# Patient Record
Sex: FEMALE | Race: WHITE | Employment: PART TIME | URBAN - NONMETROPOLITAN AREA
[De-identification: names, ages, dates, MRNs, and addresses within clinical notes are randomized per-mention and may not be internally consistent; named-entity substitution may affect disease eponyms.]

---

## 2023-09-30 SDOH — HEALTH STABILITY: PHYSICAL HEALTH: ON AVERAGE, HOW MANY MINUTES DO YOU ENGAGE IN EXERCISE AT THIS LEVEL?: 30 MIN

## 2023-09-30 SDOH — HEALTH STABILITY: PHYSICAL HEALTH: ON AVERAGE, HOW MANY DAYS PER WEEK DO YOU ENGAGE IN MODERATE TO STRENUOUS EXERCISE (LIKE A BRISK WALK)?: 3 DAYS

## 2023-10-03 ENCOUNTER — OFFICE VISIT (OUTPATIENT)
Dept: FAMILY MEDICINE CLINIC | Age: 18
End: 2023-10-03
Payer: COMMERCIAL

## 2023-10-03 VITALS
TEMPERATURE: 98.5 F | WEIGHT: 160.2 LBS | OXYGEN SATURATION: 99 % | BODY MASS INDEX: 23.73 KG/M2 | HEIGHT: 69 IN | SYSTOLIC BLOOD PRESSURE: 134 MMHG | RESPIRATION RATE: 14 BRPM | DIASTOLIC BLOOD PRESSURE: 86 MMHG | HEART RATE: 124 BPM

## 2023-10-03 DIAGNOSIS — D22.9 ATYPICAL NEVI: Primary | ICD-10-CM

## 2023-10-03 DIAGNOSIS — G43.709 CHRONIC MIGRAINE WITHOUT AURA WITHOUT STATUS MIGRAINOSUS, NOT INTRACTABLE: ICD-10-CM

## 2023-10-03 DIAGNOSIS — F41.1 GAD (GENERALIZED ANXIETY DISORDER): ICD-10-CM

## 2023-10-03 PROCEDURE — 99203 OFFICE O/P NEW LOW 30 MIN: CPT | Performed by: NURSE PRACTITIONER

## 2023-10-03 RX ORDER — TOPIRAMATE 25 MG/1
25 TABLET ORAL NIGHTLY
Qty: 30 TABLET | Refills: 3 | Status: SHIPPED | OUTPATIENT
Start: 2023-10-03 | End: 2023-11-02

## 2023-10-03 RX ORDER — HYDROXYZINE HYDROCHLORIDE 10 MG/1
10 TABLET, FILM COATED ORAL DAILY PRN
Qty: 30 TABLET | Refills: 5 | Status: SHIPPED | OUTPATIENT
Start: 2023-10-03 | End: 2023-11-02

## 2023-10-03 RX ORDER — ESCITALOPRAM OXALATE 5 MG/1
5 TABLET ORAL DAILY
Qty: 30 TABLET | Refills: 5 | Status: SHIPPED | OUTPATIENT
Start: 2023-10-03

## 2023-10-03 ASSESSMENT — PATIENT HEALTH QUESTIONNAIRE - PHQ9
9. THOUGHTS THAT YOU WOULD BE BETTER OFF DEAD, OR OF HURTING YOURSELF: 1
SUM OF ALL RESPONSES TO PHQ QUESTIONS 1-9: 22
SUM OF ALL RESPONSES TO PHQ9 QUESTIONS 1 & 2: 5
SUM OF ALL RESPONSES TO PHQ QUESTIONS 1-9: 22
3. TROUBLE FALLING OR STAYING ASLEEP: 2
1. LITTLE INTEREST OR PLEASURE IN DOING THINGS: 3
2. FEELING DOWN, DEPRESSED OR HOPELESS: 2
SUM OF ALL RESPONSES TO PHQ QUESTIONS 1-9: 21
10. IF YOU CHECKED OFF ANY PROBLEMS, HOW DIFFICULT HAVE THESE PROBLEMS MADE IT FOR YOU TO DO YOUR WORK, TAKE CARE OF THINGS AT HOME, OR GET ALONG WITH OTHER PEOPLE: 2
6. FEELING BAD ABOUT YOURSELF - OR THAT YOU ARE A FAILURE OR HAVE LET YOURSELF OR YOUR FAMILY DOWN: 3
4. FEELING TIRED OR HAVING LITTLE ENERGY: 3
7. TROUBLE CONCENTRATING ON THINGS, SUCH AS READING THE NEWSPAPER OR WATCHING TELEVISION: 3
5. POOR APPETITE OR OVEREATING: 2
8. MOVING OR SPEAKING SO SLOWLY THAT OTHER PEOPLE COULD HAVE NOTICED. OR THE OPPOSITE, BEING SO FIGETY OR RESTLESS THAT YOU HAVE BEEN MOVING AROUND A LOT MORE THAN USUAL: 3
SUM OF ALL RESPONSES TO PHQ QUESTIONS 1-9: 22

## 2023-10-03 ASSESSMENT — COLUMBIA-SUICIDE SEVERITY RATING SCALE - C-SSRS
6. HAVE YOU EVER DONE ANYTHING, STARTED TO DO ANYTHING, OR PREPARED TO DO ANYTHING TO END YOUR LIFE?: NO
1. WITHIN THE PAST MONTH, HAVE YOU WISHED YOU WERE DEAD OR WISHED YOU COULD GO TO SLEEP AND NOT WAKE UP?: NO
2. HAVE YOU ACTUALLY HAD ANY THOUGHTS OF KILLING YOURSELF?: NO

## 2023-10-03 NOTE — PROGRESS NOTES
Gisel Brennan is a 19 y.o. female that presents for New Patient, Migraine, Anxiety, and Depression      History obtained today from Patient. HPI    Diet: stays away from carbs/sugar, drinks water, sparkling water and caffeine   Exercise: active with work and school, heavy lifting. Used to go to gym  Sleep: trouble falling asleep, no issue staying asleep usually, tried Melatonin in the past.   PMH: headaches  Surgical hx:none  Social: non smoker, no street drug use  Concerns today: anxiety and depression    + headaches recently with nausea and light sensitivity  Last few years 4-5 years. 4 headaches a week which is consistent  + stress may trigger it  Start in base of neck and radiates up  Talked to provider before about it  Saw chiropractor in past.  Takes Biofreeze on neck and then takes Excedrin or ibuprofen  Push fluids    Anxiety     HPI:    Inciting events or triggers for anxiety - worse since starting  college, had some in HS   Frequency of anxiety - social, seeing people. Difficulty getting out of house, feels its getting worse, happens on daily basis  Panic attacks? Yes  Symptoms of panic attacks -  shortness of breath and nausea  Sleep Disturbances? Yes  Impaired concentration? Yes  Substance abuse? No  Suicidal/Homicidal Ideation? No    Compliant with meds: NA  Med side effects: NA  Sees therapist?: No  Family History of Mental Illness? Yes - mom has anxiety and depression and dad with ADHD possibly    Depressed Mood    HPI:    Depressed Mood? yes -   Anhedonia? yes - used to be interested in music and no longer cares  Appetite changes?  no  Sleep disturbances? yes -   Feelings of guilt? no  Decreased energy? no  Impaired concentration? yes -   Substance abuse? no    Suicidal/Homicidal Ideation? no      Compliant with meds: Yes  Family History of Mental Illness?   yes -       Skin Lesion    HPI:    Location - abdomen  Length of time it has been present - few years  Recent change in size, color or

## 2023-10-30 SDOH — ECONOMIC STABILITY: INCOME INSECURITY: HOW HARD IS IT FOR YOU TO PAY FOR THE VERY BASICS LIKE FOOD, HOUSING, MEDICAL CARE, AND HEATING?: HARD

## 2023-10-30 SDOH — ECONOMIC STABILITY: FOOD INSECURITY: WITHIN THE PAST 12 MONTHS, THE FOOD YOU BOUGHT JUST DIDN'T LAST AND YOU DIDN'T HAVE MONEY TO GET MORE.: OFTEN TRUE

## 2023-10-30 SDOH — ECONOMIC STABILITY: HOUSING INSECURITY
IN THE LAST 12 MONTHS, WAS THERE A TIME WHEN YOU DID NOT HAVE A STEADY PLACE TO SLEEP OR SLEPT IN A SHELTER (INCLUDING NOW)?: NO

## 2023-10-30 SDOH — ECONOMIC STABILITY: FOOD INSECURITY: WITHIN THE PAST 12 MONTHS, YOU WORRIED THAT YOUR FOOD WOULD RUN OUT BEFORE YOU GOT MONEY TO BUY MORE.: OFTEN TRUE

## 2023-10-30 SDOH — ECONOMIC STABILITY: TRANSPORTATION INSECURITY
IN THE PAST 12 MONTHS, HAS LACK OF TRANSPORTATION KEPT YOU FROM MEETINGS, WORK, OR FROM GETTING THINGS NEEDED FOR DAILY LIVING?: NO

## 2023-10-31 ENCOUNTER — OFFICE VISIT (OUTPATIENT)
Dept: FAMILY MEDICINE CLINIC | Age: 18
End: 2023-10-31
Payer: COMMERCIAL

## 2023-10-31 VITALS
OXYGEN SATURATION: 99 % | HEART RATE: 90 BPM | RESPIRATION RATE: 14 BRPM | BODY MASS INDEX: 24.32 KG/M2 | DIASTOLIC BLOOD PRESSURE: 76 MMHG | HEIGHT: 69 IN | WEIGHT: 164.2 LBS | TEMPERATURE: 97.5 F | SYSTOLIC BLOOD PRESSURE: 132 MMHG

## 2023-10-31 DIAGNOSIS — M54.2 CERVICALGIA: Primary | ICD-10-CM

## 2023-10-31 DIAGNOSIS — G43.709 CHRONIC MIGRAINE WITHOUT AURA WITHOUT STATUS MIGRAINOSUS, NOT INTRACTABLE: ICD-10-CM

## 2023-10-31 DIAGNOSIS — F41.1 GAD (GENERALIZED ANXIETY DISORDER): ICD-10-CM

## 2023-10-31 PROCEDURE — 99214 OFFICE O/P EST MOD 30 MIN: CPT | Performed by: NURSE PRACTITIONER

## 2023-10-31 RX ORDER — TIZANIDINE 2 MG/1
2 TABLET ORAL EVERY 8 HOURS PRN
Qty: 30 TABLET | Refills: 5 | Status: SHIPPED | OUTPATIENT
Start: 2023-10-31

## 2023-10-31 RX ORDER — PROPRANOLOL HCL 60 MG
60 CAPSULE, EXTENDED RELEASE 24HR ORAL DAILY
Qty: 30 CAPSULE | Refills: 3 | Status: SHIPPED | OUTPATIENT
Start: 2023-10-31

## 2023-10-31 NOTE — PROGRESS NOTES
Take 1 tablet by mouth every 8 hours as needed (neck pain)      Chronic migraine without aura without status migrainosus, not intractable  -     propranolol (INDERAL LA) 60 MG extended release capsule; Take 1 capsule by mouth daily    ONEAL (generalized anxiety disorder)   Continue Hydroxyzine and Lexapro, can increase Lexapro to 10 mg if tolerating 5 mg tablet ok. Possible side effects of tizanidine and propanolol discussed with patient    Return in about 4 weeks (around 11/28/2023) for migraines. No red flag sx and Start above treatments    All copied or forwarded information in the progress note was verified by me to be accurate at the time of visit  Patient's past medical, surgical, social and family history were reviewed and updated     All patient questions answered. Patient voiced understanding.

## 2023-11-30 ENCOUNTER — OFFICE VISIT (OUTPATIENT)
Dept: FAMILY MEDICINE CLINIC | Age: 18
End: 2023-11-30
Payer: COMMERCIAL

## 2023-11-30 VITALS
SYSTOLIC BLOOD PRESSURE: 126 MMHG | HEIGHT: 69 IN | HEART RATE: 83 BPM | TEMPERATURE: 97.8 F | WEIGHT: 170.6 LBS | OXYGEN SATURATION: 95 % | DIASTOLIC BLOOD PRESSURE: 72 MMHG | BODY MASS INDEX: 25.27 KG/M2 | RESPIRATION RATE: 14 BRPM

## 2023-11-30 DIAGNOSIS — F41.1 GAD (GENERALIZED ANXIETY DISORDER): Primary | ICD-10-CM

## 2023-11-30 DIAGNOSIS — G43.709 CHRONIC MIGRAINE WITHOUT AURA WITHOUT STATUS MIGRAINOSUS, NOT INTRACTABLE: ICD-10-CM

## 2023-11-30 PROCEDURE — 99213 OFFICE O/P EST LOW 20 MIN: CPT | Performed by: NURSE PRACTITIONER

## 2023-11-30 RX ORDER — ESCITALOPRAM OXALATE 10 MG/1
10 TABLET ORAL DAILY
Qty: 90 TABLET | Refills: 3 | Status: SHIPPED | OUTPATIENT
Start: 2023-11-30

## 2023-11-30 RX ORDER — PROPRANOLOL HCL 60 MG
60 CAPSULE, EXTENDED RELEASE 24HR ORAL DAILY
Qty: 90 CAPSULE | Refills: 3 | Status: SHIPPED | OUTPATIENT
Start: 2023-11-30

## 2023-11-30 NOTE — PROGRESS NOTES
Darrick Escamilla is a 19 y.o. female thatpresents for Follow-up and Migraine      History obtained today from Patient. HPI      Headaches     + headaches recently with nausea and light sensitivity  Last few years 4-5 years. 4 headaches a week which is consistent  + stress may trigger it  Start in base of neck and radiates up  Talked to provider before about it  Saw chiropractor in past.  [place] on neck and then takes Excedrin or ibuprofen  Push fluids        Update:  Started on Topamax and had side effects of tingling in extremities and changes in taste    Update this visit:  11-  Started on Propanolol last visit  Headaches about 1-2 a week now. Feels they are improved  No side effects     Anxiety      HPI:     Inciting events or triggers for anxiety - worse since starting  college, had some in HS   Frequency of anxiety - social, seeing people. Difficulty getting out of house, feels its getting worse, happens on daily basis  Panic attacks? Yes  Symptoms of panic attacks -  shortness of breath and nausea  Sleep Disturbances? Yes  Impaired concentration? Yes  Substance abuse? No  Suicidal/Homicidal Ideation? No     Compliant with meds: NA  Med side effects: NA  Sees therapist?: No  Family History of Mental Illness? Yes - mom has anxiety and depression and dad with ADHD possibly     Depressed Mood     HPI:     Depressed Mood? yes -   Anhedonia? yes - used to be interested in music and no longer cares  Appetite changes?  no  Sleep disturbances? yes -   Feelings of guilt? no  Decreased energy? no  Impaired concentration? yes -   Substance abuse? no    Suicidal/Homicidal Ideation? no       Compliant with meds: Yes  Family History of Mental Illness? yes         Update  Started on Lexapro and hydroxyzine last visit  Doing well.    Hasn't tried Hydroxyzine too much yet, if she takes it, it helps her sleep  Just now feels like it might be starting to work  Still having sleep issues, schedule is 'messed

## 2024-02-27 DIAGNOSIS — G43.709 CHRONIC MIGRAINE WITHOUT AURA WITHOUT STATUS MIGRAINOSUS, NOT INTRACTABLE: ICD-10-CM

## 2024-02-27 RX ORDER — ESCITALOPRAM OXALATE 10 MG/1
10 TABLET ORAL DAILY
Qty: 90 TABLET | Refills: 3 | Status: SHIPPED | OUTPATIENT
Start: 2024-02-27

## 2024-02-27 RX ORDER — PROPRANOLOL HCL 60 MG
60 CAPSULE, EXTENDED RELEASE 24HR ORAL DAILY
Qty: 90 CAPSULE | Refills: 3 | Status: SHIPPED | OUTPATIENT
Start: 2024-02-27

## 2024-02-27 NOTE — TELEPHONE ENCOUNTER
Recent Visits  Date Type Provider Dept   11/30/23 Office Visit Jesusita William APRN - CNP [facility]   10/31/23 Office Visit Jesusita William APRN - [facility]NP [facility]   10/03/23 Office Visit Jesusita William APRN - [facility]   Showing recent visits within past 540 days with a meds authorizing provider and meeting all other requirements  Future Appointments  Date Type Provider Dept   05/30/24 Appointment Jesusita William APRN - CNP [facility]   Showing future appointments within next 150 days with a meds authorizing provider and meeting all other requirements

## 2024-05-27 ASSESSMENT — PATIENT HEALTH QUESTIONNAIRE - PHQ9
SUM OF ALL RESPONSES TO PHQ QUESTIONS 1-9: 3
3. TROUBLE FALLING OR STAYING ASLEEP: MORE THAN HALF THE DAYS
7. TROUBLE CONCENTRATING ON THINGS, SUCH AS READING THE NEWSPAPER OR WATCHING TELEVISION: NOT AT ALL
7. TROUBLE CONCENTRATING ON THINGS, SUCH AS READING THE NEWSPAPER OR WATCHING TELEVISION: NOT AT ALL
SUM OF ALL RESPONSES TO PHQ9 QUESTIONS 1 & 2: 0
5. POOR APPETITE OR OVEREATING: NOT AT ALL
4. FEELING TIRED OR HAVING LITTLE ENERGY: SEVERAL DAYS
3. TROUBLE FALLING OR STAYING ASLEEP: MORE THAN HALF THE DAYS
1. LITTLE INTEREST OR PLEASURE IN DOING THINGS: NOT AT ALL
8. MOVING OR SPEAKING SO SLOWLY THAT OTHER PEOPLE COULD HAVE NOTICED. OR THE OPPOSITE, BEING SO FIGETY OR RESTLESS THAT YOU HAVE BEEN MOVING AROUND A LOT MORE THAN USUAL: NOT AT ALL
10. IF YOU CHECKED OFF ANY PROBLEMS, HOW DIFFICULT HAVE THESE PROBLEMS MADE IT FOR YOU TO DO YOUR WORK, TAKE CARE OF THINGS AT HOME, OR GET ALONG WITH OTHER PEOPLE: SOMEWHAT DIFFICULT
5. POOR APPETITE OR OVEREATING: NOT AT ALL
SUM OF ALL RESPONSES TO PHQ QUESTIONS 1-9: 3
4. FEELING TIRED OR HAVING LITTLE ENERGY: SEVERAL DAYS
8. MOVING OR SPEAKING SO SLOWLY THAT OTHER PEOPLE COULD HAVE NOTICED. OR THE OPPOSITE - BEING SO FIDGETY OR RESTLESS THAT YOU HAVE BEEN MOVING AROUND A LOT MORE THAN USUAL: NOT AT ALL
2. FEELING DOWN, DEPRESSED OR HOPELESS: NOT AT ALL
9. THOUGHTS THAT YOU WOULD BE BETTER OFF DEAD, OR OF HURTING YOURSELF: NOT AT ALL
SUM OF ALL RESPONSES TO PHQ QUESTIONS 1-9: 3
1. LITTLE INTEREST OR PLEASURE IN DOING THINGS: NOT AT ALL
6. FEELING BAD ABOUT YOURSELF - OR THAT YOU ARE A FAILURE OR HAVE LET YOURSELF OR YOUR FAMILY DOWN: NOT AT ALL
SUM OF ALL RESPONSES TO PHQ QUESTIONS 1-9: 3
6. FEELING BAD ABOUT YOURSELF - OR THAT YOU ARE A FAILURE OR HAVE LET YOURSELF OR YOUR FAMILY DOWN: NOT AT ALL
10. IF YOU CHECKED OFF ANY PROBLEMS, HOW DIFFICULT HAVE THESE PROBLEMS MADE IT FOR YOU TO DO YOUR WORK, TAKE CARE OF THINGS AT HOME, OR GET ALONG WITH OTHER PEOPLE: SOMEWHAT DIFFICULT
SUM OF ALL RESPONSES TO PHQ QUESTIONS 1-9: 3
SUM OF ALL RESPONSES TO PHQ9 QUESTIONS 1 & 2: 0
9. THOUGHTS THAT YOU WOULD BE BETTER OFF DEAD, OR OF HURTING YOURSELF: NOT AT ALL
2. FEELING DOWN, DEPRESSED OR HOPELESS: NOT AT ALL

## 2024-05-30 ENCOUNTER — OFFICE VISIT (OUTPATIENT)
Dept: FAMILY MEDICINE CLINIC | Age: 19
End: 2024-05-30
Payer: COMMERCIAL

## 2024-05-30 VITALS
OXYGEN SATURATION: 99 % | DIASTOLIC BLOOD PRESSURE: 82 MMHG | HEIGHT: 69 IN | HEART RATE: 69 BPM | BODY MASS INDEX: 25.12 KG/M2 | RESPIRATION RATE: 14 BRPM | TEMPERATURE: 97.3 F | WEIGHT: 169.6 LBS | SYSTOLIC BLOOD PRESSURE: 122 MMHG

## 2024-05-30 DIAGNOSIS — G43.709 CHRONIC MIGRAINE WITHOUT AURA WITHOUT STATUS MIGRAINOSUS, NOT INTRACTABLE: ICD-10-CM

## 2024-05-30 DIAGNOSIS — F41.1 GAD (GENERALIZED ANXIETY DISORDER): Primary | ICD-10-CM

## 2024-05-30 DIAGNOSIS — F33.42 RECURRENT MAJOR DEPRESSIVE DISORDER, IN FULL REMISSION (HCC): ICD-10-CM

## 2024-05-30 PROCEDURE — 99213 OFFICE O/P EST LOW 20 MIN: CPT | Performed by: NURSE PRACTITIONER

## 2024-05-30 NOTE — PROGRESS NOTES
Lawanda Ocasio is a 18 y.o. female thatpresents for 6 Month Follow-Up and Migraine      History obtained today from Patient.    HPI      Headaches     + headaches recently with nausea and light sensitivity  4 headaches a week which is consistent  + stress may trigger it  Start in base of neck and radiates up  Talked to provider before about it  Saw chiropractor in past.  Takes Biofreeze on neck and then takes Excedrin or ibuprofen  Push fluids          Update this visit:  11-  Started on Propanolol last visit  Headaches about 1-2 a week now. Feels they are improved  No side effects  Had side effects to Topamax      This visit 5-  Has headache Once every 2 weeks  Doing well on propanolol    Takes Ibuprofen or Excedrin     Anxiety and Depression    Update  Started on Lexapro and hydroxyzine last visit  Doing well.   Hasn't tried Hydroxyzine too much yet, if she takes it, it helps her sleep  Just now feels like it might be starting to work  Still having sleep issues, schedule is 'messed up' right now.      Update this visit  Feels Lexapro is working well for her at 10 mg dose.       Today's visit 5-  Appetite is good  Sleep is hit or miss  Graduated this month  Continues on Lexapro and doing well on it  PHQ9 -3 today, was 22 when initially seen in OCT 2023      I have reviewed the patient's past medical history, past surgical history, allergies,medications, social and family history and I have made updates where appropriate.    History reviewed. No pertinent past medical history.    Social History     Tobacco Use    Smoking status: Never    Smokeless tobacco: Never   Vaping Use    Vaping Use: Never used   Substance Use Topics    Alcohol use: Never    Drug use: Never       Family History   Problem Relation Age of Onset    Mental Illness Mother     Clotting Disorder Father     No Known Problems Brother          Review of Systems    Review of Systems    Constitutional: Negative for Fever, Chills,

## 2024-10-21 DIAGNOSIS — G43.709 CHRONIC MIGRAINE WITHOUT AURA WITHOUT STATUS MIGRAINOSUS, NOT INTRACTABLE: ICD-10-CM

## 2024-10-21 RX ORDER — PROPRANOLOL HYDROCHLORIDE 60 MG/1
60 CAPSULE, EXTENDED RELEASE ORAL DAILY
Qty: 90 CAPSULE | Refills: 3 | Status: SHIPPED | OUTPATIENT
Start: 2024-10-21

## 2024-10-21 NOTE — TELEPHONE ENCOUNTER
Recent Visits  Date Type Provider Dept   05/30/24 Office Visit Jesusita William, APRN - CNP [facility]   11/30/23 Office Visit Jesusita William, APRN - [facility]   10/31/23 Office Visit Jesusita William, APRN - [facility]   10/03/23 Office Visit Jesusita William, APRN - C[facility]   Showing recent visits within past 540 days with a meds authorizing provider and meeting all other requirements  Future Appointments  No visits were found meeting these conditions.  Showing future appointments within next 150 days with a meds authorizing provider and meeting all other requirements

## 2025-02-13 RX ORDER — ESCITALOPRAM OXALATE 10 MG/1
10 TABLET ORAL DAILY
Qty: 90 TABLET | Refills: 3 | Status: SHIPPED | OUTPATIENT
Start: 2025-02-13

## 2025-06-10 SDOH — ECONOMIC STABILITY: FOOD INSECURITY: WITHIN THE PAST 12 MONTHS, THE FOOD YOU BOUGHT JUST DIDN'T LAST AND YOU DIDN'T HAVE MONEY TO GET MORE.: NEVER TRUE

## 2025-06-10 SDOH — ECONOMIC STABILITY: INCOME INSECURITY: IN THE LAST 12 MONTHS, WAS THERE A TIME WHEN YOU WERE NOT ABLE TO PAY THE MORTGAGE OR RENT ON TIME?: YES

## 2025-06-10 SDOH — ECONOMIC STABILITY: FOOD INSECURITY: WITHIN THE PAST 12 MONTHS, YOU WORRIED THAT YOUR FOOD WOULD RUN OUT BEFORE YOU GOT MONEY TO BUY MORE.: NEVER TRUE

## 2025-06-10 SDOH — ECONOMIC STABILITY: TRANSPORTATION INSECURITY
IN THE PAST 12 MONTHS, HAS THE LACK OF TRANSPORTATION KEPT YOU FROM MEDICAL APPOINTMENTS OR FROM GETTING MEDICATIONS?: NO

## 2025-06-10 ASSESSMENT — PATIENT HEALTH QUESTIONNAIRE - PHQ9
8. MOVING OR SPEAKING SO SLOWLY THAT OTHER PEOPLE COULD HAVE NOTICED. OR THE OPPOSITE - BEING SO FIDGETY OR RESTLESS THAT YOU HAVE BEEN MOVING AROUND A LOT MORE THAN USUAL: NOT AT ALL
7. TROUBLE CONCENTRATING ON THINGS, SUCH AS READING THE NEWSPAPER OR WATCHING TELEVISION: NOT AT ALL
4. FEELING TIRED OR HAVING LITTLE ENERGY: NEARLY EVERY DAY
6. FEELING BAD ABOUT YOURSELF - OR THAT YOU ARE A FAILURE OR HAVE LET YOURSELF OR YOUR FAMILY DOWN: SEVERAL DAYS
10. IF YOU CHECKED OFF ANY PROBLEMS, HOW DIFFICULT HAVE THESE PROBLEMS MADE IT FOR YOU TO DO YOUR WORK, TAKE CARE OF THINGS AT HOME, OR GET ALONG WITH OTHER PEOPLE: SOMEWHAT DIFFICULT
8. MOVING OR SPEAKING SO SLOWLY THAT OTHER PEOPLE COULD HAVE NOTICED. OR THE OPPOSITE, BEING SO FIGETY OR RESTLESS THAT YOU HAVE BEEN MOVING AROUND A LOT MORE THAN USUAL: NOT AT ALL
7. TROUBLE CONCENTRATING ON THINGS, SUCH AS READING THE NEWSPAPER OR WATCHING TELEVISION: NOT AT ALL
1. LITTLE INTEREST OR PLEASURE IN DOING THINGS: SEVERAL DAYS
2. FEELING DOWN, DEPRESSED OR HOPELESS: SEVERAL DAYS
9. THOUGHTS THAT YOU WOULD BE BETTER OFF DEAD, OR OF HURTING YOURSELF: NOT AT ALL
3. TROUBLE FALLING OR STAYING ASLEEP: SEVERAL DAYS
4. FEELING TIRED OR HAVING LITTLE ENERGY: NEARLY EVERY DAY
SUM OF ALL RESPONSES TO PHQ QUESTIONS 1-9: 8
5. POOR APPETITE OR OVEREATING: SEVERAL DAYS
2. FEELING DOWN, DEPRESSED OR HOPELESS: SEVERAL DAYS
10. IF YOU CHECKED OFF ANY PROBLEMS, HOW DIFFICULT HAVE THESE PROBLEMS MADE IT FOR YOU TO DO YOUR WORK, TAKE CARE OF THINGS AT HOME, OR GET ALONG WITH OTHER PEOPLE: SOMEWHAT DIFFICULT
SUM OF ALL RESPONSES TO PHQ QUESTIONS 1-9: 8
3. TROUBLE FALLING OR STAYING ASLEEP: SEVERAL DAYS
1. LITTLE INTEREST OR PLEASURE IN DOING THINGS: SEVERAL DAYS
9. THOUGHTS THAT YOU WOULD BE BETTER OFF DEAD, OR OF HURTING YOURSELF: NOT AT ALL
6. FEELING BAD ABOUT YOURSELF - OR THAT YOU ARE A FAILURE OR HAVE LET YOURSELF OR YOUR FAMILY DOWN: SEVERAL DAYS
5. POOR APPETITE OR OVEREATING: SEVERAL DAYS
SUM OF ALL RESPONSES TO PHQ QUESTIONS 1-9: 8

## 2025-06-13 ENCOUNTER — OFFICE VISIT (OUTPATIENT)
Dept: FAMILY MEDICINE CLINIC | Age: 20
End: 2025-06-13
Payer: COMMERCIAL

## 2025-06-13 VITALS
OXYGEN SATURATION: 99 % | SYSTOLIC BLOOD PRESSURE: 124 MMHG | HEIGHT: 69 IN | DIASTOLIC BLOOD PRESSURE: 74 MMHG | TEMPERATURE: 98.2 F | WEIGHT: 173.4 LBS | BODY MASS INDEX: 25.68 KG/M2 | RESPIRATION RATE: 14 BRPM | HEART RATE: 103 BPM

## 2025-06-13 DIAGNOSIS — G43.709 CHRONIC MIGRAINE WITHOUT AURA WITHOUT STATUS MIGRAINOSUS, NOT INTRACTABLE: ICD-10-CM

## 2025-06-13 DIAGNOSIS — Z00.00 ANNUAL PHYSICAL EXAM: Primary | ICD-10-CM

## 2025-06-13 DIAGNOSIS — Z12.4 CERVICAL CANCER SCREENING: ICD-10-CM

## 2025-06-13 PROCEDURE — 99395 PREV VISIT EST AGE 18-39: CPT | Performed by: NURSE PRACTITIONER

## 2025-06-13 RX ORDER — PROPRANOLOL HYDROCHLORIDE 60 MG/1
60 CAPSULE, EXTENDED RELEASE ORAL DAILY
Qty: 90 CAPSULE | Refills: 3 | Status: SHIPPED | OUTPATIENT
Start: 2025-06-13

## 2025-06-13 NOTE — PROGRESS NOTES
Lawanda Ocasio is a 19 y.o. female thatpresents for Annual Exam      History obtained today from Patient.    Annual physical exam    Moods: on Lexapro, doing well   Sleep: sleep is good  Physical : does a lot at work, likes to hike  Diet: doesn't follow any particular diet. Likes fruits and vegetables, drinks water          Headaches     + headaches recently with nausea and light sensitivity  4 headaches a week which is consistent  + stress may trigger it  Start in base of neck and radiates up  Talked to provider before about it  Saw chiropractor in past.  Takes Biofreeze on neck and then takes Excedrin or ibuprofen  Push fluids  Started on Propanolol last visit  Headaches about 1-2 a week now. Feels they are improved     6-  Out of propanolol  Getting migraines more frequently  Twice a week       Anxiety and Depression    On Lexapro and Hydroxyzine  Moods good for the most part  Sleep is ok       Wears glasses, needs to schedule appt  Up to date with dentist     Cervical cancer screening - not yet, agreeable to seeing OBGYN  Periods are regular    I have reviewed the patient's past medical history, past surgical history, allergies,medications, social and family history and I have made updates where appropriate.    History reviewed. No pertinent past medical history.    Social History     Tobacco Use    Smoking status: Never    Smokeless tobacco: Never   Vaping Use    Vaping status: Never Used   Substance Use Topics    Alcohol use: Never    Drug use: Never       Family History   Problem Relation Age of Onset    Mental Illness Mother     Clotting Disorder Father     No Known Problems Brother          Review of Systems      Review of Systems    Constitutional: Negative for Fever, Chills, Fatigue  Cardiovascular:  Negative forChest Pain, Palpitations  Respiratory:  Negative for Cough, Wheezing, Shortness of breath  Gastrointestinal:  Nausea, Vomiting, Diarrhea, Constipation, Blood in stool  Genitourinary: